# Patient Record
Sex: FEMALE | Race: BLACK OR AFRICAN AMERICAN | Employment: FULL TIME | URBAN - METROPOLITAN AREA
[De-identification: names, ages, dates, MRNs, and addresses within clinical notes are randomized per-mention and may not be internally consistent; named-entity substitution may affect disease eponyms.]

---

## 2017-09-20 ENCOUNTER — APPOINTMENT (OUTPATIENT)
Dept: GENERAL RADIOLOGY | Facility: HOSPITAL | Age: 32
End: 2017-09-20
Attending: EMERGENCY MEDICINE

## 2017-09-20 ENCOUNTER — HOSPITAL ENCOUNTER (EMERGENCY)
Facility: HOSPITAL | Age: 32
Discharge: HOME | End: 2017-09-21
Attending: EMERGENCY MEDICINE

## 2017-09-20 VITALS
OXYGEN SATURATION: 97 % | DIASTOLIC BLOOD PRESSURE: 59 MMHG | SYSTOLIC BLOOD PRESSURE: 107 MMHG | BODY MASS INDEX: 35.79 KG/M2 | RESPIRATION RATE: 20 BRPM | HEIGHT: 63 IN | HEART RATE: 74 BPM | TEMPERATURE: 98 F | WEIGHT: 202 LBS

## 2017-09-20 DIAGNOSIS — M25.561 ACUTE PAIN OF RIGHT KNEE: Primary | ICD-10-CM
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
## 2017-09-20 LAB — B-HCG UR QL: NEGATIVE

## 2017-09-20 PROCEDURE — 73560 X-RAY EXAM OF KNEE 1 OR 2: CPT | Performed by: EMERGENCY MEDICINE

## 2017-09-20 PROCEDURE — 81025 URINE PREGNANCY TEST: CPT

## 2017-09-20 PROCEDURE — 99283 EMERGENCY DEPT VISIT LOW MDM: CPT

## 2017-09-20 RX ORDER — IBUPROFEN 600 MG/1
600 TABLET ORAL ONCE
Status: COMPLETED | OUTPATIENT
Start: 2017-09-20 | End: 2017-09-20

## 2017-09-20 RX ORDER — IBUPROFEN 600 MG/1
600 TABLET ORAL EVERY 8 HOURS PRN
Qty: 20 TABLET | Refills: 0 | Status: SHIPPED | OUTPATIENT
Start: 2017-09-20 | End: 2017-09-27

## 2017-09-21 NOTE — ED INITIAL ASSESSMENT (HPI)
Pt states she was just standing when her right knee gave out and she fell. Pt was able to stand up with help but complains of right knee pain.

## 2017-09-21 NOTE — ED PROVIDER NOTES
Patient Seen in: [facility] AND [facility] Emergency Department    History   Patient presents with:  Knee Pain  Fall    Stated Complaint: Right knee pain due to fall    HPI    36-year-old female with no significant past medical history here with complaints after systems are as noted in HPI. Constitutional and vital signs reviewed. All other systems reviewed and negative except as noted above. PSFH elements reviewed from today and agreed except as otherwise stated in HPI.     Physical Exam   ED Triage Vital hour(s))  -[facility] POCT PREGNANCY URINE   Collection Time: 09/20/17 11:39 PM   Result Value Ref Range   POCT Urine Pregnancy Negative Negative       Imaging Results Available and Reviewed by me while in ED:      RIGHT KNEE - 2308 HOURS    IMPRESSION  1.  No def initial diagnoses were considered based on the presenting problem including sprain, fracture, contusion, dislocation.       Disposition and Plan     Clinical Impression:  Acute pain of right knee  (primary encounter diagnosis)  Fall, initial encounter    Sofy Elizabeth

## 2017-09-21 NOTE — DISCHARGE PLANNING
Met with patient to assist with transportation to her car. Patient stated she has her car and all her belonging at her place of employment , and has no one to pick her up.    Patient has Workman's compensation and this CM attempted to call her place of emplo

## (undated) NOTE — ED AVS SNAPSHOT
Beatrice Chopra   MRN: [id number]    Department:  [facility] Emergency Department   Date of Visit:  9/20/2017           Disclosure     Insurance plans vary and the physician(s) referred by the ER may not be covered by your plan.  Please contact CARE PHYSICIAN AT ONCE OR RETURN IMMEDIATELY TO THE EMERGENCY DEPARTMENT. If you have been prescribed any medication(s), please fill your prescription right away and begin taking the medication(s) as directed.   If you believe that any of the medications